# Patient Record
Sex: MALE | Race: WHITE
[De-identification: names, ages, dates, MRNs, and addresses within clinical notes are randomized per-mention and may not be internally consistent; named-entity substitution may affect disease eponyms.]

---

## 2023-05-12 ENCOUNTER — HOSPITAL ENCOUNTER (OUTPATIENT)
Dept: HOSPITAL 65 - RAD | Age: 58
Discharge: HOME | End: 2023-05-12
Attending: NURSE PRACTITIONER
Payer: COMMERCIAL

## 2023-05-12 DIAGNOSIS — J84.10: Primary | ICD-10-CM

## 2023-05-12 DIAGNOSIS — Z00.00: ICD-10-CM

## 2023-05-12 PROCEDURE — 71046 X-RAY EXAM CHEST 2 VIEWS: CPT

## 2023-05-12 NOTE — DIREP
PROCEDURE:CHEST 2 VIEWS

 

COMPARISON:CT scan of chest without contrast, 09/22/2020 [facility].

 

INDICATIONS:HX AND PHYSICAL EVALUATION

 

FINDINGS:,

LUNGS/PLEURA:Coarse interstitial prominence of bilateral lung markings. No 

airspace consolidation or pleural effusion.

VASCULATURE:Normal. Unremarkable pulmonary vasculature.

CARDIAC:Normal. No cardiac silhouette abnormality or cardiomegaly. 

MEDIASTINUM:Normal. No visible mass or adenopathy. 

BONES:Normal. No fracture or visible bony lesion. 

OTHER:Negative. 

 

CONCLUSION:

1. There are findings suggestive of mild-to-moderate chronic interstitial 

fibrosis, consistent with findings on identified on prior CT scan of 2020.

 2. Normal heart size. No air symptoms of airspace consolidation or pleural 

effusion. 

 

 

Dictated by: Eldon Guzman M.D. on 05/12/2023 at 04:42 PM 

Electronically Signed By: Eldon Guzman M.D. on 05/12/2023 at 04:45 PM

## 2023-05-12 NOTE — DIREP
PROCEDURE:XRAY SHOULDER MIN 2 VWS-RT

 

COMPARISON:None.

 

INDICATIONS:HX AND PHYSICAL EVALUATION

 

FINDINGS:

BONES:Normal. No fracture. No lytic or blastic lesion of bone.

JOINTS:Normal glenohumeral joint. Normal acromioclavicular joint. No evidence 

for dislocation.

SOFT TISSUES:Normal. 

OTHER:Interstitial prominence of the right lung markings.

 

CONCLUSION:No bony degenerative change or acute bony trauma of the right 

shoulder.

 

 

Dictated by: Eldon Guzman M.D. on 05/12/2023 at 04:09 PM 

Electronically Signed By: Eldon Guzman M.D. on 05/12/2023 at 04:09 PM

## 2025-04-11 ENCOUNTER — HOSPITAL ENCOUNTER (OUTPATIENT)
Dept: HOSPITAL 65 - RAD | Age: 60
Discharge: HOME | End: 2025-04-11
Attending: NURSE PRACTITIONER
Payer: COMMERCIAL

## 2025-04-11 DIAGNOSIS — J84.9: Primary | ICD-10-CM

## 2025-04-11 PROCEDURE — 71046 X-RAY EXAM CHEST 2 VIEWS: CPT
